# Patient Record
Sex: MALE | Race: WHITE | HISPANIC OR LATINO
[De-identification: names, ages, dates, MRNs, and addresses within clinical notes are randomized per-mention and may not be internally consistent; named-entity substitution may affect disease eponyms.]

---

## 2024-06-12 ENCOUNTER — NON-APPOINTMENT (OUTPATIENT)
Age: 34
End: 2024-06-12

## 2024-06-12 ENCOUNTER — APPOINTMENT (OUTPATIENT)
Dept: COLORECTAL SURGERY | Facility: CLINIC | Age: 34
End: 2024-06-12
Payer: COMMERCIAL

## 2024-06-12 VITALS
TEMPERATURE: 98.2 F | HEIGHT: 71 IN | BODY MASS INDEX: 26.6 KG/M2 | WEIGHT: 190 LBS | DIASTOLIC BLOOD PRESSURE: 86 MMHG | SYSTOLIC BLOOD PRESSURE: 136 MMHG | HEART RATE: 84 BPM

## 2024-06-12 DIAGNOSIS — Z83.3 FAMILY HISTORY OF DIABETES MELLITUS: ICD-10-CM

## 2024-06-12 DIAGNOSIS — Z82.49 FAMILY HISTORY OF ISCHEMIC HEART DISEASE AND OTHER DISEASES OF THE CIRCULATORY SYSTEM: ICD-10-CM

## 2024-06-12 DIAGNOSIS — Z78.9 OTHER SPECIFIED HEALTH STATUS: ICD-10-CM

## 2024-06-12 DIAGNOSIS — K64.8 OTHER HEMORRHOIDS: ICD-10-CM

## 2024-06-12 DIAGNOSIS — Z82.3 FAMILY HISTORY OF STROKE: ICD-10-CM

## 2024-06-12 PROBLEM — Z00.00 ENCOUNTER FOR PREVENTIVE HEALTH EXAMINATION: Status: ACTIVE | Noted: 2024-06-12

## 2024-06-12 PROCEDURE — 99203 OFFICE O/P NEW LOW 30 MIN: CPT | Mod: 25

## 2024-06-12 PROCEDURE — 46600 DIAGNOSTIC ANOSCOPY SPX: CPT

## 2024-06-12 RX ORDER — EMTRICITABINE AND TENOFOVIR ALAFENAMIDE 120; 15 MG/1; MG/1
TABLET ORAL
Refills: 0 | Status: ACTIVE | COMMUNITY

## 2024-06-12 RX ORDER — HYDROCORTISONE ACETATE 25 MG/1
25 SUPPOSITORY RECTAL TWICE DAILY
Qty: 14 | Refills: 0 | Status: ACTIVE | COMMUNITY
Start: 2024-06-12 | End: 1900-01-01

## 2024-06-12 NOTE — PHYSICAL EXAM
[Abdomen Tenderness] : ~T No ~M abdominal tenderness [JVD] : no jugular venous distention  [Normal Breath Sounds] : Normal breath sounds [No Rash or Lesion] : No rash or lesion [Alert] : alert [Calm] : calm [de-identified] : Well appearing male in NAD [de-identified] : MMM [de-identified] : ROM WNL [FreeTextEntry1] : The pt was examined in the prone monik-knife position with a medical assistant present for the entirety of the examination. Visual examination of the anal verge with effacement of the buttocks revealed no masses, ulcerations, fissures or skin rashes. Digital rectal exam revealed a small indurated nodule in the RA quadrant otherwise no gross palpable mass or blood with sphincter tone within normal limits. A lubricated anoscope was then inserted revealing mildly inflamed distal rectal mucosa and anoderm with possibly a convalescent thrombosis of a RA internal hemorrhoidal column.  The patient tolerated the exam well.

## 2024-06-12 NOTE — PLAN
[TextEntry] : - Will treat topically first. - RTC in 2-3 weeks for re-evaluation. - Consideration of anal PAP and STI swab prior to anorectal exam on next visit.

## 2024-06-12 NOTE — HISTORY OF PRESENT ILLNESS
[FreeTextEntry1] : 34 y/o M presents for initial evaluation "anal pain". Pt reports a new onset of anal pressure and pain, particularly after bowel movements starting 5 days prior. The discomfort has waxed and waned since Friday and is a bit better today. Bowel movements have been unaffected, and he doesn't recall any straining prior to the episode. He reports have 2 BMs daily without blood. Denies pruritus, swelling, discharge, prolapse or external growth. 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   PMH: None PSH: Wart removal ALL: NKDA Meds: Descovy (Prep) FH: Denies CRC/IBD Colonoscopy: Never

## 2024-06-26 ENCOUNTER — APPOINTMENT (OUTPATIENT)
Dept: COLORECTAL SURGERY | Facility: CLINIC | Age: 34
End: 2024-06-26
Payer: COMMERCIAL

## 2024-06-26 VITALS
HEART RATE: 79 BPM | WEIGHT: 190 LBS | HEIGHT: 71 IN | SYSTOLIC BLOOD PRESSURE: 135 MMHG | TEMPERATURE: 97.8 F | BODY MASS INDEX: 26.6 KG/M2 | DIASTOLIC BLOOD PRESSURE: 80 MMHG

## 2024-06-26 DIAGNOSIS — Z12.9 ENCOUNTER FOR SCREENING FOR MALIGNANT NEOPLASM, SITE UNSPECIFIED: ICD-10-CM

## 2024-06-26 PROCEDURE — 99212 OFFICE O/P EST SF 10 MIN: CPT

## 2024-06-28 LAB — ANAL PAP CYTOLOGY: NORMAL

## 2025-06-18 ENCOUNTER — APPOINTMENT (OUTPATIENT)
Dept: COLORECTAL SURGERY | Facility: CLINIC | Age: 35
End: 2025-06-18